# Patient Record
Sex: MALE | Race: WHITE | NOT HISPANIC OR LATINO
[De-identification: names, ages, dates, MRNs, and addresses within clinical notes are randomized per-mention and may not be internally consistent; named-entity substitution may affect disease eponyms.]

---

## 2023-05-11 PROBLEM — Z00.00 ENCOUNTER FOR PREVENTIVE HEALTH EXAMINATION: Status: ACTIVE | Noted: 2023-05-11

## 2023-05-12 ENCOUNTER — APPOINTMENT (OUTPATIENT)
Dept: ORTHOPEDIC SURGERY | Facility: CLINIC | Age: 68
End: 2023-05-12
Payer: MEDICARE

## 2023-05-12 VITALS — BODY MASS INDEX: 28.73 KG/M2 | HEIGHT: 69 IN | WEIGHT: 194 LBS

## 2023-05-12 DIAGNOSIS — I10 ESSENTIAL (PRIMARY) HYPERTENSION: ICD-10-CM

## 2023-05-12 PROCEDURE — 99203 OFFICE O/P NEW LOW 30 MIN: CPT | Mod: 25

## 2023-05-12 PROCEDURE — 20610 DRAIN/INJ JOINT/BURSA W/O US: CPT

## 2023-05-12 RX ORDER — AMLODIPINE BESYLATE 5 MG/1
5 TABLET ORAL
Refills: 0 | Status: ACTIVE | COMMUNITY

## 2023-05-12 NOTE — HISTORY OF PRESENT ILLNESS
[Gradual] : gradual [2] : 2 [0] : 0 [Localized] : localized [de-identified] : 5-12-23- 5+ month history of right knee pain and effusion. [facility] taken and followed up with pcp. He was given rx for unknown nsaids  without help. pcp sends for ortho consult.  [] : no [FreeTextEntry1] : right knee  [FreeTextEntry3] : about 5 months [FreeTextEntry5] : no injury, patient is feeling increasing stiffness in the knee

## 2023-05-12 NOTE — ASSESSMENT
[FreeTextEntry1] : will aspirate and administer csi see how he does consider ha injections if symptoms persist 99223-Initial hospital care - high complexity

## 2023-05-12 NOTE — PHYSICAL EXAM
[NL (0)] : extension 0 degrees [5___] : hamstring 5[unfilled]/5 [Negative] : negative Solange's [] : negative Lachmann [Right] : right knee [AP] : anteroposterior [Lateral] : lateral [Outside films reviewed] : Outside films reviewed [Moderate tricompartmental OA lateral narrowing] : Moderate tricompartmental OA lateral narrowing [TWNoteComboBox7] : flexion 100 degrees

## 2023-05-12 NOTE — DATA REVIEWED
Reviewed with Dr. Figueroa who will start Cardizem CD 120mg daily.    [Outside X-rays] : outside x-rays [Right] : of the right [Knee] : knee [I reviewed the films/CD and agree] : I reviewed the films/CD and agree [I reviewed the films/CD] : I reviewed the films/CD [FreeTextEntry1] : med and pf narrowing

## 2023-06-29 ENCOUNTER — FORM ENCOUNTER (OUTPATIENT)
Age: 68
End: 2023-06-29

## 2023-06-29 ENCOUNTER — APPOINTMENT (OUTPATIENT)
Dept: ORTHOPEDIC SURGERY | Facility: CLINIC | Age: 68
End: 2023-06-29
Payer: MEDICARE

## 2023-06-29 VITALS — WEIGHT: 194 LBS | BODY MASS INDEX: 28.73 KG/M2 | HEIGHT: 69 IN

## 2023-06-29 PROCEDURE — 99213 OFFICE O/P EST LOW 20 MIN: CPT

## 2023-06-29 PROCEDURE — 73562 X-RAY EXAM OF KNEE 3: CPT | Mod: RT

## 2023-06-29 NOTE — ASSESSMENT
Admission Date:   12/4/2017    Requesting Consultation:   Sourav Noble MD    Primary Care Physician:  Shanti Stallworth FreeTextEntry1] : I am unsure what is causing the large effusions, needs MRI to r/o internal derangement (Sharan Garcia MD

## 2023-06-29 NOTE — HISTORY OF PRESENT ILLNESS
[Gradual] : gradual [2] : 2 [0] : 0 [Localized] : localized [de-identified] : 5-12-23- 5+ month history of right knee pain and effusion. [facility] taken and followed up with pcp. He was given rx for unknown nsaids  without help. pcp sends for ortho consult.  [] : no [FreeTextEntry1] : right knee  [FreeTextEntry3] : about 5 months [FreeTextEntry5] : no injury, patient is feeling increasing stiffness in the knee

## 2023-06-29 NOTE — PHYSICAL EXAM
[NL (0)] : extension 0 degrees [5___] : hamstring 5[unfilled]/5 [Negative] : negative Solange's [Right] : right knee [AP] : anteroposterior [Lateral] : lateral [Outside films reviewed] : Outside films reviewed [Moderate tricompartmental OA lateral narrowing] : Moderate tricompartmental OA lateral narrowing [] : negative Lachmann [TWNoteComboBox7] : flexion 100 degrees

## 2023-06-30 ENCOUNTER — APPOINTMENT (OUTPATIENT)
Dept: MRI IMAGING | Facility: CLINIC | Age: 68
End: 2023-06-30
Payer: MEDICARE

## 2023-06-30 PROCEDURE — 73721 MRI JNT OF LWR EXTRE W/O DYE: CPT | Mod: RT

## 2023-07-13 ENCOUNTER — APPOINTMENT (OUTPATIENT)
Dept: ORTHOPEDIC SURGERY | Facility: CLINIC | Age: 68
End: 2023-07-13
Payer: MEDICARE

## 2023-07-13 VITALS — WEIGHT: 194 LBS | BODY MASS INDEX: 28.73 KG/M2 | HEIGHT: 69 IN

## 2023-07-13 DIAGNOSIS — S83.241D OTHER TEAR OF MEDIAL MENISCUS, CURRENT INJURY, RIGHT KNEE, SUBSEQUENT ENCOUNTER: ICD-10-CM

## 2023-07-13 PROCEDURE — 99213 OFFICE O/P EST LOW 20 MIN: CPT

## 2023-07-13 NOTE — DISCUSSION/SUMMARY
[de-identified] : MRI reviewed, I doubt the meniscus is causing this amount of swelling. Will get authorization for Euflexxa right knee, may help joint form more normal fluid.

## 2023-07-13 NOTE — HISTORY OF PRESENT ILLNESS
[Gradual] : gradual [2] : 2 [0] : 0 [Localized] : localized [de-identified] : 5-12-23- 5+ month history of right knee pain and effusion. [facility] taken and followed up with pcp. He was given rx for unknown nsaids  without help. pcp sends for ortho consult.  [] : no [FreeTextEntry1] : right knee  [FreeTextEntry3] : about 5 months [FreeTextEntry5] : no injury, patient is feeling increasing stiffness in the knee

## 2023-07-13 NOTE — REASON FOR VISIT
[FreeTextEntry2] : 7/13/23- Had MRI: Medial meniscal tear with OA medial compartment\par 6/29/23- The swelling came right back in right knee

## 2023-07-13 NOTE — PHYSICAL EXAM
[NL (0)] : extension 0 degrees [5___] : hamstring 5[unfilled]/5 [Negative] : negative Solange's [] : patient ambulates without assistive device [Right] : right knee [AP] : anteroposterior [Lateral] : lateral [Outside films reviewed] : Outside films reviewed [Moderate tricompartmental OA lateral narrowing] : Moderate tricompartmental OA lateral narrowing [TWNoteComboBox7] : flexion 100 degrees

## 2023-07-28 ENCOUNTER — APPOINTMENT (OUTPATIENT)
Dept: ORTHOPEDIC SURGERY | Facility: CLINIC | Age: 68
End: 2023-07-28
Payer: MEDICAID

## 2023-07-28 DIAGNOSIS — M17.11 UNILATERAL PRIMARY OSTEOARTHRITIS, RIGHT KNEE: ICD-10-CM

## 2023-07-28 DIAGNOSIS — M25.461 EFFUSION, RIGHT KNEE: ICD-10-CM

## 2023-07-28 PROCEDURE — 99213 OFFICE O/P EST LOW 20 MIN: CPT | Mod: 25

## 2023-07-28 PROCEDURE — 20610 DRAIN/INJ JOINT/BURSA W/O US: CPT | Mod: RT

## 2023-07-28 NOTE — HISTORY OF PRESENT ILLNESS
[de-identified] : 5-12-23- 5+ month history of right knee pain and effusion. [facility] taken and followed up with pcp. He was given rx for unknown nsaids  without help. pcp sends for ortho consult.  [Gradual] : gradual [2] : 2 [0] : 0 [Localized] : localized [] : no [FreeTextEntry1] : right knee  [FreeTextEntry3] : about 5 months [FreeTextEntry5] : no injury, patient is feeling increasing stiffness in the knee

## 2023-07-28 NOTE — REASON FOR VISIT
[FreeTextEntry2] : 7/28/23- For Synvisc One right knee\par 7/13/23- Had MRI: Medial meniscal tear with OA medial compartment\par 6/29/23- The swelling came right back in right knee no

## 2023-07-28 NOTE — PROCEDURE
[Large Joint Injection] : Large joint injection [Right] : of the right [Pain] : pain [Knee] : knee [Alcohol] : alcohol [Betadine] : betadine [Ethyl Chloride sprayed topically] : ethyl chloride sprayed topically [Synvisc-one (48mg)] : 48mg of Synvisc-one [Sterile technique used] : sterile technique used [Effusion] : effusion [de-identified] : 300cc [de-identified] : clear

## 2023-08-02 ENCOUNTER — APPOINTMENT (OUTPATIENT)
Dept: GASTROENTEROLOGY | Facility: CLINIC | Age: 68
End: 2023-08-02
Payer: MEDICARE

## 2023-08-02 VITALS
WEIGHT: 192 LBS | HEART RATE: 76 BPM | DIASTOLIC BLOOD PRESSURE: 84 MMHG | BODY MASS INDEX: 28.44 KG/M2 | OXYGEN SATURATION: 98 % | SYSTOLIC BLOOD PRESSURE: 178 MMHG | HEIGHT: 69 IN

## 2023-08-02 DIAGNOSIS — Z12.11 ENCOUNTER FOR SCREENING FOR MALIGNANT NEOPLASM OF COLON: ICD-10-CM

## 2023-08-02 DIAGNOSIS — Z12.12 ENCOUNTER FOR SCREENING FOR MALIGNANT NEOPLASM OF COLON: ICD-10-CM

## 2023-08-02 DIAGNOSIS — Z78.9 OTHER SPECIFIED HEALTH STATUS: ICD-10-CM

## 2023-08-02 PROCEDURE — 99203 OFFICE O/P NEW LOW 30 MIN: CPT

## 2023-08-02 RX ORDER — SODIUM SULFATE, POTASSIUM SULFATE AND MAGNESIUM SULFATE 1.6; 3.13; 17.5 G/177ML; G/177ML; G/177ML
17.5-3.13-1.6 SOLUTION ORAL
Qty: 354 | Refills: 0 | Status: ACTIVE | COMMUNITY
Start: 2023-08-02 | End: 1900-01-01

## 2023-08-02 NOTE — PHYSICAL EXAM
[Alert] : alert [Normal Voice/Communication] : normal voice/communication [Healthy Appearing] : healthy appearing [No Acute Distress] : no acute distress [Well Nourished] : well nourished [Sclera] : the sclera and conjunctiva were normal [Hearing Threshold Finger Rub Not [place]] : hearing was normal [Normal Appearance] : the appearance of the neck was normal [No Respiratory Distress] : no respiratory distress [No Acc Muscle Use] : no accessory muscle use [Respiration, Rhythm And Depth] : normal respiratory rhythm and effort [Heart Rate And Rhythm] : heart rate was normal and rhythm regular [Murmurs] : no murmurs [Bowel Sounds] : normal bowel sounds [Abdomen Tenderness] : non-tender [No Masses] : no abdominal mass palpated [Abdomen Soft] : soft [] : no hepatosplenomegaly [No CVA Tenderness] : no CVA  tenderness [Oriented To Time, Place, And Person] : oriented to person, place, and time

## 2023-08-02 NOTE — ASSESSMENT
[FreeTextEntry1] : Impression  Request for colon cancer screening  Suggest  Agree with colonoscopy  Suprep  The laxative, or its risks benefits and alternatives have been thoroughly reviewed with the patient in great detail. The laxative instructions have been reviewed in great detail with the patient.  Risks/benefits: The procedure, the risks and benefits and alternatives have been reviewed in great detail with the patient.  Risks including, but not limited to sedation such as cardiac and pulmonary compromise, the procedure itself such as bleeding requiring hospitalization, transfusion, surgery, temporary or permanent colostomy.  Perforation or puncture of the requiring hospitalization, surgery, temporary colostomy. It has been explained to the patient that though colonoscopy is thought to be the best screening exam for colon cancer and polyps, no screening exam can find all colon polyps or cancers.   The patient expresses understanding of the procedure and consents to undergoing the procedure.

## 2023-08-02 NOTE — CONSULT LETTER
[Dear  ___] : Dear  [unfilled], [Courtesy Letter:] : I had the pleasure of seeing your patient, [unfilled], in my office today. [Please see my note below.] : Please see my note below. [Consult Closing:] : Thank you very much for allowing me to participate in the care of this patient.  If you have any questions, please do not hesitate to contact me. [Sincerely,] : Sincerely, [FreeTextEntry2] : Dr. Alberto Wade [facility] [FreeTextEntry3] : Buck Salazar MD

## 2023-08-02 NOTE — HISTORY OF PRESENT ILLNESS
[FreeTextEntry1] : Aug 02, 2023   Doesnt have PCP   Mr. SUDHA MCKEE 68 year is referred for colon cancer screening.  The patient denies any change in bowel movements, blood per rectum, abdominal, pelvic or rectal discomfort.     There is no family history of colon cancer or other gastrointestinal cancers.  The patient denies any unexplained weight loss, fever chills or night sweats.  There is no significant cardiac or pulmonary history.  No complaints of chest pain, shortness of breath, palpitations, cough.  The patient is feeling quite well.  The patient is on no significant anticoagulant therapy or anti platelet therapy.   No adverse reaction to anesthesia in the past.

## 2023-08-04 ENCOUNTER — APPOINTMENT (OUTPATIENT)
Dept: ORTHOPEDIC SURGERY | Facility: CLINIC | Age: 68
End: 2023-08-04

## 2023-08-11 ENCOUNTER — APPOINTMENT (OUTPATIENT)
Dept: ORTHOPEDIC SURGERY | Facility: CLINIC | Age: 68
End: 2023-08-11

## 2023-09-14 ENCOUNTER — APPOINTMENT (OUTPATIENT)
Dept: ORTHOPEDIC SURGERY | Facility: CLINIC | Age: 68
End: 2023-09-14

## 2023-10-25 ENCOUNTER — APPOINTMENT (OUTPATIENT)
Dept: GASTROENTEROLOGY | Facility: AMBULATORY MEDICAL SERVICES | Age: 68
End: 2023-10-25